# Patient Record
(demographics unavailable — no encounter records)

---

## 2024-12-17 NOTE — PHYSICAL EXAM
[Chaperone Present] : A chaperone was present in the examining room during all aspects of the physical examination [45084] : A chaperone was present during the pelvic exam. [FreeTextEntry2] : fritz

## 2024-12-17 NOTE — HISTORY OF PRESENT ILLNESS
[TextBox_4] : pt presents for eval of irregular bleeding, reports heavy bleeding x 15 days  was seen in ED for eval and had CT scan showing malpositioned and embedded IUD  was told to follow up with GYN outpatient to coordinate removal  states bleeding has remained heavy since that time

## 2024-12-17 NOTE — PROCEDURE
[IUD Removal] : intrauterine device (IUD) removal [Consent Obtained] : Consent obtained [Risks] : risks [Benefits] : benefits [Alternatives] : alternatives [Patient] : patient [Speculum Placed] : speculum placed [Strings Visualized] : strings visualized [IUD Discarded] : IUD discarded [Sent to Pathology] : specimen was placed in buffered formalin and sent for pathology [Tolerated Well] : Patient tolerated the procedure well [No Complications] : no complications [Heavy Vaginal Bleeding] : for heavy vaginal bleeding [Pelvic Pain] : for pelvic pain [de-identified] : strings grasped with curved xiang clamp, IUD partially expulsed from external os at time of speculum placement

## 2024-12-17 NOTE — PLAN
[FreeTextEntry1] : 12/14 CT: IUD in distal cervix or junction of the vagina and cervix , inferior tip extends to serosal surface  H/H wnl in ED  IUD removed today without incident, protruding through os at time of speculum placement Pt to monitor bleeding symptoms  If persistent, would rec emb to rule out endometrial pathology